# Patient Record
Sex: FEMALE | Race: BLACK OR AFRICAN AMERICAN | NOT HISPANIC OR LATINO | Employment: UNEMPLOYED | URBAN - METROPOLITAN AREA
[De-identification: names, ages, dates, MRNs, and addresses within clinical notes are randomized per-mention and may not be internally consistent; named-entity substitution may affect disease eponyms.]

---

## 2018-01-26 ENCOUNTER — OFFICE VISIT - HEALTHEAST (OUTPATIENT)
Dept: FAMILY MEDICINE | Facility: CLINIC | Age: 28
End: 2018-01-26

## 2018-01-26 ENCOUNTER — COMMUNICATION - HEALTHEAST (OUTPATIENT)
Dept: TELEHEALTH | Facility: CLINIC | Age: 28
End: 2018-01-26

## 2018-01-26 DIAGNOSIS — I44.1 MOBITZ TYPE 1 SECOND DEGREE AV BLOCK: ICD-10-CM

## 2018-01-26 DIAGNOSIS — R06.02 SOB (SHORTNESS OF BREATH): ICD-10-CM

## 2018-01-26 LAB
ATRIAL RATE - MUSE: 75 BPM
DIASTOLIC BLOOD PRESSURE - MUSE: NORMAL MMHG
INTERPRETATION ECG - MUSE: NORMAL
P AXIS - MUSE: 13 DEGREES
PR INTERVAL - MUSE: NORMAL MS
QRS DURATION - MUSE: 92 MS
QT - MUSE: 394 MS
QTC - MUSE: 383 MS
R AXIS - MUSE: 59 DEGREES
SYSTOLIC BLOOD PRESSURE - MUSE: NORMAL MMHG
T AXIS - MUSE: 51 DEGREES
VENTRICULAR RATE- MUSE: 57 BPM

## 2018-01-26 RX ORDER — DIPHENHYDRAMINE HCL 25 MG
25 CAPSULE ORAL EVERY 6 HOURS PRN
Status: SHIPPED | COMMUNITY
Start: 2018-01-26

## 2018-01-26 RX ORDER — NAPROXEN SODIUM 220 MG
220 TABLET ORAL 2 TIMES DAILY WITH MEALS
Status: SHIPPED | COMMUNITY
Start: 2018-01-26

## 2018-01-30 ENCOUNTER — OFFICE VISIT - HEALTHEAST (OUTPATIENT)
Dept: CARDIOLOGY | Facility: CLINIC | Age: 28
End: 2018-01-30

## 2018-01-30 DIAGNOSIS — I44.1 WENCKEBACH BLOCK: ICD-10-CM

## 2018-01-30 DIAGNOSIS — R07.89 OTHER CHEST PAIN: ICD-10-CM

## 2018-01-30 RX ORDER — CHLORAL HYDRATE 500 MG
2 CAPSULE ORAL DAILY
Status: SHIPPED | COMMUNITY
Start: 2018-01-30

## 2018-01-30 ASSESSMENT — MIFFLIN-ST. JEOR: SCORE: 1400.79

## 2018-01-31 ENCOUNTER — COMMUNICATION - HEALTHEAST (OUTPATIENT)
Dept: CARDIOLOGY | Facility: CLINIC | Age: 28
End: 2018-01-31

## 2018-01-31 LAB — B BURGDOR IGG+IGM SER QL: 0.04 INDEX VALUE

## 2018-02-09 ENCOUNTER — HOSPITAL ENCOUNTER (OUTPATIENT)
Dept: CARDIOLOGY | Facility: HOSPITAL | Age: 28
Discharge: HOME | End: 2018-02-09
Attending: INTERNAL MEDICINE

## 2018-02-09 DIAGNOSIS — I44.1 WENCKEBACH BLOCK: ICD-10-CM

## 2018-02-09 LAB
AORTIC ROOT: 3 CM
AORTIC VALVE MEAN VELOCITY: 94.8 CM/S
ASCENDING AORTA: 3 CM
AV DIMENSIONLESS INDEX VTI: 0.7
AV MEAN GRADIENT: 4 MMHG
AV PEAK GRADIENT: 7.2 MMHG
AV VALVE AREA: 2.3 CM2
AV VELOCITY RATIO: 0.7
BSA FOR ECHO PROCEDURE: 1.78 M2
CV BLOOD PRESSURE: NORMAL MMHG
CV ECHO HEIGHT: 65 IN
CV ECHO WEIGHT: 153 LBS
DOP CALC AO PEAK VEL: 134 CM/S
DOP CALC AO VTI: 28.6 CM
DOP CALC LVOT AREA: 3.14 CM2
DOP CALC LVOT DIAMETER: 2 CM
DOP CALC LVOT PEAK VEL: 99 CM/S
DOP CALC LVOT STROKE VOLUME: 65.3 CM3
DOP CALCLVOT PEAK VEL VTI: 20.8 CM
EJECTION FRACTION: 66 % (ref 55–75)
FRACTIONAL SHORTENING: 28.8 % (ref 28–44)
INTERVENTRICULAR SEPTUM IN END DIASTOLE: 1.21 CM (ref 0.6–0.9)
IVS/PW RATIO: 1.2
LA AREA 1: 13.2 CM2
LA AREA 2: 19.2 CM2
LEFT ATRIUM LENGTH: 4.7 CM
LEFT ATRIUM SIZE: 2.7 CM
LEFT ATRIUM VOLUME INDEX: 25.7 ML/M2
LEFT ATRIUM VOLUME: 45.8 ML
LEFT VENTRICLE DIASTOLIC VOLUME INDEX: 49.4 CM3/M2 (ref 34–74)
LEFT VENTRICLE DIASTOLIC VOLUME: 88 CM3 (ref 46–106)
LEFT VENTRICLE MASS INDEX: 91.7 G/M2
LEFT VENTRICLE SYSTOLIC VOLUME INDEX: 16.8 CM3/M2 (ref 11–31)
LEFT VENTRICLE SYSTOLIC VOLUME: 29.9 CM3 (ref 14–42)
LEFT VENTRICULAR INTERNAL DIMENSION IN DIASTOLE: 4.27 CM (ref 3.8–5.2)
LEFT VENTRICULAR INTERNAL DIMENSION IN SYSTOLE: 3.04 CM (ref 2.2–3.5)
LEFT VENTRICULAR MASS: 163.3 G
LEFT VENTRICULAR OUTFLOW TRACT MEAN GRADIENT: 2 MMHG
LEFT VENTRICULAR OUTFLOW TRACT MEAN VELOCITY: 58.8 CM/S
LEFT VENTRICULAR OUTFLOW TRACT PEAK GRADIENT: 4 MMHG
LEFT VENTRICULAR POSTERIOR WALL IN END DIASTOLE: 1.01 CM (ref 0.6–0.9)
LV STROKE VOLUME INDEX: 36.7 ML/M2
MITRAL VALVE E/A RATIO: 1.2
MV AVERAGE E/E' RATIO: 5.1 CM/S
MV DECELERATION TIME: 197 MS
MV E'TISSUE VEL-LAT: 18.9 CM/S
MV E'TISSUE VEL-MED: 11.4 CM/S
MV LATERAL E/E' RATIO: 4.1
MV MEDIAL E/E' RATIO: 6.8
MV PEAK A VELOCITY: 62.1 CM/S
MV PEAK E VELOCITY: 77.1 CM/S
NUC REST DIASTOLIC VOLUME INDEX: 2451.2 LBS
NUC REST SYSTOLIC VOLUME INDEX: 65 IN
TRICUSPID REGURGITATION PEAK PRESSURE GRADIENT: 11.2 MMHG
TRICUSPID VALVE PEAK REGURGITANT VELOCITY: 167 CM/S

## 2018-02-09 ASSESSMENT — MIFFLIN-ST. JEOR
SCORE: 1400.79
SCORE: 1400.79

## 2018-02-22 ENCOUNTER — COMMUNICATION - HEALTHEAST (OUTPATIENT)
Dept: SCHEDULING | Facility: CLINIC | Age: 28
End: 2018-02-22

## 2018-03-08 ENCOUNTER — COMMUNICATION - HEALTHEAST (OUTPATIENT)
Dept: ADMINISTRATIVE | Facility: CLINIC | Age: 28
End: 2018-03-08

## 2018-03-09 ENCOUNTER — OFFICE VISIT - HEALTHEAST (OUTPATIENT)
Dept: CARDIOLOGY | Facility: CLINIC | Age: 28
End: 2018-03-09

## 2018-03-09 DIAGNOSIS — I44.1 WENCKEBACH SECOND DEGREE AV BLOCK: ICD-10-CM

## 2018-03-09 DIAGNOSIS — I49.9 CARDIAC DYSRHYTHMIA: ICD-10-CM

## 2018-03-09 LAB
ATRIAL RATE - MUSE: 70 BPM
DIASTOLIC BLOOD PRESSURE - MUSE: NORMAL MMHG
INTERPRETATION ECG - MUSE: NORMAL
P AXIS - MUSE: 21 DEGREES
PR INTERVAL - MUSE: NORMAL MS
QRS DURATION - MUSE: 90 MS
QT - MUSE: 402 MS
QTC - MUSE: 391 MS
R AXIS - MUSE: 54 DEGREES
SYSTOLIC BLOOD PRESSURE - MUSE: NORMAL MMHG
T AXIS - MUSE: 51 DEGREES
VENTRICULAR RATE- MUSE: 57 BPM

## 2018-03-09 RX ORDER — FERROUS SULFATE 325(65) MG
1 TABLET ORAL
Status: SHIPPED | COMMUNITY
Start: 2018-03-09

## 2018-03-09 ASSESSMENT — MIFFLIN-ST. JEOR: SCORE: 1418.03

## 2018-03-13 ENCOUNTER — COMMUNICATION - HEALTHEAST (OUTPATIENT)
Dept: CARDIOLOGY | Facility: CLINIC | Age: 28
End: 2018-03-13

## 2018-03-15 ENCOUNTER — OFFICE VISIT - HEALTHEAST (OUTPATIENT)
Dept: FAMILY MEDICINE | Facility: CLINIC | Age: 28
End: 2018-03-15

## 2018-03-15 DIAGNOSIS — R05.9 COUGH: ICD-10-CM

## 2018-05-18 ENCOUNTER — COMMUNICATION - HEALTHEAST (OUTPATIENT)
Dept: CARDIOLOGY | Facility: CLINIC | Age: 28
End: 2018-05-18

## 2018-06-22 ENCOUNTER — OFFICE VISIT - HEALTHEAST (OUTPATIENT)
Dept: CARDIOLOGY | Facility: CLINIC | Age: 28
End: 2018-06-22

## 2018-06-22 DIAGNOSIS — I44.1 MOBITZ TYPE I WENCKEBACH ATRIOVENTRICULAR BLOCK: ICD-10-CM

## 2018-06-22 RX ORDER — IBUPROFEN 800 MG/1
800 TABLET, FILM COATED ORAL PRN
Status: SHIPPED | COMMUNITY
Start: 2018-05-19

## 2018-06-22 ASSESSMENT — MIFFLIN-ST. JEOR: SCORE: 1390.81

## 2018-12-12 ENCOUNTER — COMMUNICATION - HEALTHEAST (OUTPATIENT)
Dept: CARDIOLOGY | Facility: CLINIC | Age: 28
End: 2018-12-12

## 2018-12-12 ENCOUNTER — COMMUNICATION - HEALTHEAST (OUTPATIENT)
Dept: SCHEDULING | Facility: CLINIC | Age: 28
End: 2018-12-12

## 2021-05-31 VITALS — WEIGHT: 156.2 LBS

## 2021-06-01 VITALS — BODY MASS INDEX: 25.52 KG/M2 | HEIGHT: 65 IN | WEIGHT: 153.2 LBS

## 2021-06-01 VITALS — WEIGHT: 151 LBS | BODY MASS INDEX: 25.16 KG/M2 | HEIGHT: 65 IN

## 2021-06-01 VITALS — BODY MASS INDEX: 25.79 KG/M2 | WEIGHT: 155 LBS

## 2021-06-01 VITALS — BODY MASS INDEX: 26.16 KG/M2 | WEIGHT: 157 LBS | HEIGHT: 65 IN

## 2021-06-01 VITALS — WEIGHT: 153.2 LBS | HEIGHT: 65 IN | BODY MASS INDEX: 25.52 KG/M2

## 2021-06-15 NOTE — PROGRESS NOTES
Chief Complaint   Patient presents with     Shortness of Breath     Had pnumonoia in 2016 and she is having simliar sx. also sharp pain in the mid/upper back. Pt stated she is not coughing, also no fever. Fatigue.        History of Present Illness: Nursing notes reviewed. Patient had an episode of sharp upper chest pain about 5-6 cade ago lasting seconds. Since then, she has had intermittent upper chest discomfort daily since then, lasting up to a couple of minutes, with associated shortness of breath. She had chest pain and shortness of breath with prior pneumonia, so having pneumonia is her primary concern.  She has no prior history of heart problems.  She is concerned that not getting enough sleep every night for a couple of months may be contributing to her symptoms.  She has only been averaging about 2-3 hours per night.    Review of systems: See history of present illness, otherwise negative.     Current Outpatient Prescriptions   Medication Sig Dispense Refill     diphenhydrAMINE (BENADRYL) 25 mg capsule Take 25 mg by mouth every 6 (six) hours as needed for itching.       naproxen sodium (ALEVE) 220 MG tablet Take 220 mg by mouth 2 (two) times a day with meals.       No current facility-administered medications for this visit.        No past medical history on file.   No past surgical history on file.   Social History     Social History     Marital status: Single     Spouse name: N/A     Number of children: N/A     Years of education: N/A     Social History Main Topics     Smoking status: Never Smoker     Smokeless tobacco: Never Used     Alcohol use None     Drug use: None     Sexual activity: Not Asked     Other Topics Concern     None     Social History Narrative     None       History   Smoking Status     Never Smoker   Smokeless Tobacco     Never Used      Exam:   Blood pressure 102/62, pulse 65, temperature 97.8  F (36.6  C), temperature source Oral, resp. rate 16, weight 156 lb 3.2 oz (70.9 kg), last  menstrual period 01/10/2018, SpO2 98 %.    EXAM:   General: Vital signs reviewed. Patient is in no acute appearing distress with a normal pleasant affect.  She can get up and down from exam room chair and table without difficulty.  Breathing is non labored appearing. Patient is alert and oriented x 3.   ENT: Tympanic membranes are clear and without injection bilaterally, nasal turbinates show no injection or rhinorrhea, no pharyngeal injection or exudate.  Neck: supple with no adenopathy or palpable thyroid abnormality  Heart: mildly bradycardic rate and irregular rhythm without murmur, with a pause noted about every 3 beats.  Lungs: Clear to auscultation with good air flow bilaterally.  Skin: warm and dry      Recent Results (from the past 24 hour(s))   Electrocardiogram Perform and Read   Result Value Ref Range    SYSTOLIC BLOOD PRESSURE  mmHg    DIASTOLIC BLOOD PRESSURE  mmHg    VENTRICULAR RATE 57 BPM    ATRIAL RATE 75 BPM    P-R INTERVAL  ms    QRS DURATION 92 ms    Q-T INTERVAL 394 ms    QTC CALCULATION (BEZET) 383 ms    P Axis 13 degrees    R AXIS 59 degrees    T AXIS 51 degrees    MUSE DIAGNOSIS       Sinus rhythm with 2nd degree A-V block (Mobitz I)  Nonspecific ST abnormality  Abnormal ECG  No previous ECGs available  Confirmed by ALEX BLUE MD LOC:[facility] ([id number]) on 1/26/2018 4:02:08 PM      Results from exam reviewed with patient.    Assessment/Plan   1. SOB (shortness of breath)  Electrocardiogram Perform and Read       There are no Patient Instructions on file for this visit.   Rakesh Page DO

## 2021-06-16 PROBLEM — I44.1 WENCKEBACH SECOND DEGREE AV BLOCK: Status: ACTIVE | Noted: 2018-03-09

## 2021-06-16 NOTE — PROGRESS NOTES
Chief Complaint   Patient presents with     poss dry Cough     x2weeks  bad cold          HPI    Patient is here for 2 wks of nonproductive cough, worst at night, improved with honey. She also has mild nasal congestion. No fever, chills, chest pain, shortness of breath.    ROS: Pertinent ROS noted in HPI.     No Known Allergies    Patient Active Problem List   Diagnosis     Wenckebach second degree AV block       Family History   Problem Relation Age of Onset     No Medical Problems Mother      Hypertension Father        Social History     Social History     Marital status: Single     Spouse name: N/A     Number of children: N/A     Years of education: N/A     Occupational History     Not on file.     Social History Main Topics     Smoking status: Never Smoker     Smokeless tobacco: Never Used      Comment: Hookah     Alcohol use No     Drug use: No     Sexual activity: Not on file     Other Topics Concern     Not on file     Social History Narrative         Objective:    Vitals:    03/15/18 1615   BP: 120/75   Pulse: 62   Resp: 15   Temp: 98  F (36.7  C)   SpO2: 100%       Gen:NAD  Throat: oropharynx clear, tonsils normal  Ears: TMs clear without effusions  Nose: no discharge  Neck;NAD  CV: RRR, no M, R, G  Pulm: CTAB, normal effort    CXR - negative chest per my interpretation, discussed during visit.      Cough  -     XR Chest 2 Views      Likely virally mediated symptoms. No evidence of bacterial infection. Patient declined Benzonatate. Fluids, supportive measures as discussed.

## 2021-06-26 NOTE — PROGRESS NOTES
Progress Notes by Carrie Weldon MD at 1/30/2018 11:20 AM     Author: Carrie Weldon MD Service: -- Author Type: Physician    Filed: 2/1/2018  1:06 PM Encounter Date: 1/30/2018 Status: Signed    : Carrie Weldon MD (Physician)           Click to link to [facility] HEART CARE NOTE    Thank you, Dr. Hussein, for asking us to see Everett Colorado at the [facility].      Assessment/Recommendations   Assessment:    This is a 28-year-old woman with history of anemia who has had 2 weeks of feeling very congested and more short of breath.  Due to this she came into the ED and was found to be in Wenckebach.  No symptoms of presyncope or syncope.  No known tick bites.  Labs in the ED including thyroid studies were unremarkable.  Will plan on proceeding with Holter monitor as well as echocardiogram to evaluate further for structural heart disease as well.  Check Lyme titer as well.  If she develops symptoms such as lightheadedness or syncopal episodes will need to refer to EP for consideration of pacemaker placement.       History of Present Illness    Ms. Everett Colorado is a 28 y.o. female with no significant past medical history who I am seeing today rapid access clinic for second-degree heart block.  She reports that her medical history is only remarkable for pneumonia that she had back in 2016.  A couple weeks ago she started noticing some chest discomfort when she took a deep breath and some shortness of breath.  Due to this she came into urgent care last Friday.  On her twelve-lead EKG she was noted to have second-degree Mobitz 1 heart block and was sent to the ER.  After reviewed with cardiology she was discharged from the ED.  She is under a great deal of personal stress.  She lost 2 close family members the first week in December and since then has had difficulty sleeping and she works full-time as well.  Recently with the breathing trouble  she has felt congested.  No complaints of presyncope or syncopal episodes.  She has history of anemia with hemoglobin of 9 noted in the ED and was on iron pills for this however stopped taking them due to constipation.       Physical Examination Review of Systems   Vitals:    01/30/18 1133   BP: 102/62   Pulse: 88   Resp: 24     Body mass index is 25.49 kg/(m^2).  Wt Readings from Last 3 Encounters:   01/30/18 153 lb 3.2 oz (69.5 kg)   01/26/18 156 lb (70.8 kg)   01/26/18 156 lb 3.2 oz (70.9 kg)       General Appearance:   alert, no apparent distress   HEENT:  no scleral icterus; the mucous membranes are pink and moist                                  Neck: jugular venous pressure normal   Chest: the spine is straight and the chest is symmetric   Lungs:   respirations unlabored; the lungs are clear to auscultation   Cardiovascular:   regular rhythm with normal first and second heart sounds and no murmurs or gallops; carotid pulses are intact and there are no carotid bruits.   Abdomen:  no organomegaly, masses, bruits, or tenderness; bowel sounds are present   Extremities: no edema   Skin: no xanthelasma    General: WNL  Eyes: WNL  Ears/Nose/Throat: WNL  Lungs: Shortness of Breath  Heart: Chest Pain  Stomach: Constipation, Heartburn  Bladder: WNL  Muscle/Joints: WNL  Skin: WNL  Nervous System: WNL  Mental Health: Anxiety     Blood: WNL     Medical History  Surgical History Family History Social History   anemia No past surgical history on file.   No significant family history of congenital heart disease or premature coronary artery disease Social History     Social History   ? Marital status: Single     Spouse name: N/A   ? Number of children: N/A   ? Years of education: N/A     Occupational History   ? Not on file.     Social History Main Topics   ? Smoking status: Current Some Day Smoker   ? Smokeless tobacco: Never Used      Comment: Hookah   ? Alcohol use Not on file   ? Drug use: Not on file   ? Sexual activity:  Not on file     Other Topics Concern   ? Not on file     Social History Narrative          Medications  Allergies   Current Outpatient Prescriptions   Medication Sig Dispense Refill   ? calcium carbonate (TUMS EXTRA STRENGTH SMOOTHIES) 300 mg (750 mg) Chew Chew. PRN     ? diphenhydrAMINE (BENADRYL) 25 mg capsule Take 25 mg by mouth every 6 (six) hours as needed for itching.     ? naproxen sodium (ALEVE) 220 MG tablet Take 220 mg by mouth 2 (two) times a day with meals.     ? OMEGA-3/DHA/EPA/FISH OIL (FISH OIL-OMEGA-3 FATTY ACIDS) 300-1,000 mg capsule Take 2 g by mouth daily. gummies     ? PRENATAL VIT 91/IRON/FOLIC/DHA (PRENATAL + DHA ORAL) Take 1 tablet by mouth daily.       No current facility-administered medications for this visit.       No Known Allergies      Lab Results    Chemistry/lipid CBC Cardiac Enzymes/BNP/TSH/INR   Lab Results   Component Value Date    CREATININE 0.65 01/26/2018    BUN 5 (L) 01/26/2018    K 3.7 01/26/2018     01/26/2018     01/26/2018    CO2 24 01/26/2018    Lab Results   Component Value Date    WBC 6.0 01/26/2018    HGB 9.3 (L) 01/26/2018    HCT 32.8 (L) 01/26/2018    MCV 70 (L) 01/26/2018    PLT  01/26/2018      Comment:      Clumped Platelets; estimate from smear appears to be normal     Lab Results   Component Value Date    TROPONINI <0.01 01/26/2018    BNP 30 01/26/2018    TSH 2.69 01/26/2018

## 2021-06-26 NOTE — PROGRESS NOTES
Progress Notes by Carrie Weldon MD at 6/22/2018  2:30 PM     Author: Carrie Weldon MD Service: -- Author Type: Physician    Filed: 6/27/2018  9:34 AM Encounter Date: 6/22/2018 Status: Signed    : Carrie Weldon MD (Physician)           Click to link to [facility] HEART CARE NOTE    Thank you, Dr. Burns, for asking us to see Everett Colorado at the [facility].      Assessment/Recommendations   Assessment:    This is a 28 year old woman with Mobitz 1 second degree atrioventricular block.  She is asymptomatic and has been doing well since our last follow up.  She has been more active without presyncopal or syncopal episodes.  May follow up in one year.        History of Present Illness    Ms. Everett Colorado is a 28 y.o. female with history of Mobitz I atrioventricular block who I am seeing today in follow up.  She was found to have  Mobitz 1 second degree atrioventricular block during the day and at night with 2:1 block with heart rates in the 30s at night.  No complaints of light headedness, presyncopal or syncopal episodes.  She was seen by EP and no further recommendations made.  She has been doing very well. She has been exercising more frequently and eating healthier.     Holter monitor 2/9/18   HOLTER MONITOR      INTERPRETATION DATE:  02/13/2018      TEST DATE:  02/09/2018      INTERPRETATION:  Predominant rhythm is sinus rhythm which is present throughout the  monitoring period.  Mobitz type 1 second-degree AV block was common both during  daytime hours and at night.  Transient 2:1 AV block with heart rate in the 30s is  seen at night.  Average nocturnal heart rate was approximately 50 beats per minute.   During daytime hours 4:3 AV conduction and somewhat less certain degrees of AV block  was seen with average resting heart rate around 60 beats per minute; 1:1 AV  conduction with first-degree AV block was also relatively common.   Occasional atrial  premature beats were noted.  Rare single ventricular premature beats were noted, 5  over 24 hours.  No symptoms were noted in the diary.     CONCLUSION:  Sinus rhythm with Mobitz type 1 second-degree AV block commonly present with transient 2:1 AV block at night with heart rates in the 30s.  Average nocturnal  heart rate was 50 and average daytime resting heart rate was 60 associated with  Mobitz 1 AV block.        BERNARD COOL 02/13/2018 13:52:28  T 02/13/2018 14:23:26  R 02/13/2018 14:23:26  [id number]     Echocardiogram 2/9/18    No previous study for comparison.    Normal left ventricular size.    Left ventricle ejection fraction is normal. The calculated left ventricular ejection fraction is 66%.    Right ventricle not optimally visualized. Right ventricular function suggested to be within normal limits.    No definite valve abnormality observed.       Physical Examination Review of Systems   Vitals:    06/22/18 1454   BP: 90/68     Body mass index is 25.13 kg/(m^2).  Wt Readings from Last 3 Encounters:   06/22/18 151 lb (68.5 kg)   03/15/18 155 lb (70.3 kg)   03/09/18 157 lb (71.2 kg)       General Appearance:   alert, no apparent distress   HEENT:  no scleral icterus; the mucous membranes are pink and moist                                  Neck: jugular venous pressure normal   Chest: the spine is straight and the chest is symmetric   Lungs:   respirations unlabored; the lungs are clear to auscultation   Cardiovascular:   regular rhythm with normal first and second heart sounds and no murmurs or gallops   Abdomen:  no organomegaly, masses, bruits, or tenderness; bowel sounds are present   Extremities: no cyanosis, clubbing, or edema   Skin: no xanthelasma    General: WNL  Eyes: WNL  Ears/Nose/Throat: WNL  Lungs: Shortness of Breath  Heart: WNL  Stomach: WNL  Bladder: WNL  Muscle/Joints: WNL  Skin: WNL  Nervous System: WNL  Mental Health: Anxiety     Blood: WNL     Medical History   Surgical History Family History Social History   Past Medical History:   Diagnosis Date   ? Abnormal ECG     No past surgical history on file. Family History   Problem Relation Age of Onset   ? No Medical Problems Mother    ? Hypertension Father     Social History     Social History   ? Marital status: Single     Spouse name: N/A   ? Number of children: N/A   ? Years of education: N/A     Occupational History   ? Not on file.     Social History Main Topics   ? Smoking status: Never Smoker   ? Smokeless tobacco: Never Used      Comment: Hookah   ? Alcohol use No   ? Drug use: No   ? Sexual activity: Not on file     Other Topics Concern   ? Not on file     Social History Narrative          Medications  Allergies   Current Outpatient Prescriptions   Medication Sig Dispense Refill   ? ferrous sulfate 325 (65 FE) MG tablet Take 1 tablet by mouth daily with breakfast.     ? ibuprofen (ADVIL,MOTRIN) 800 MG tablet Take 800 mg by mouth as needed.     ? naproxen sodium (ALEVE) 220 MG tablet Take 220 mg by mouth 2 (two) times a day with meals.     ? calcium carbonate (TUMS EXTRA STRENGTH SMOOTHIES) 300 mg (750 mg) Chew Chew. PRN     ? diphenhydrAMINE (BENADRYL) 25 mg capsule Take 25 mg by mouth every 6 (six) hours as needed for itching.     ? fluticasone (FLOVENT DISKUS) 50 mcg/actuation diskus inhaler Inhale 1 puff as needed.     ? OMEGA-3/DHA/EPA/FISH OIL (FISH OIL-OMEGA-3 FATTY ACIDS) 300-1,000 mg capsule Take 2 g by mouth daily. gummies     ? PRENATAL VIT 91/IRON/FOLIC/DHA (PRENATAL + DHA ORAL) Take 1 tablet by mouth daily.     ? ranitidine (ZANTAC) 150 MG capsule Take 150 mg by mouth 2 (two) times a day.       No current facility-administered medications for this visit.       No Known Allergies      Lab Results    Chemistry/lipid CBC Cardiac Enzymes/BNP/TSH/INR   Lab Results   Component Value Date    CREATININE 0.65 01/26/2018    BUN 5 (L) 01/26/2018    K 3.7 01/26/2018     01/26/2018     01/26/2018    CO2 24  01/26/2018    Lab Results   Component Value Date    WBC 6.0 01/26/2018    HGB 9.3 (L) 01/26/2018    HCT 32.8 (L) 01/26/2018    MCV 70 (L) 01/26/2018    PLT  01/26/2018      Comment:      Clumped Platelets; estimate from smear appears to be normal     Lab Results   Component Value Date    TROPONINI <0.01 01/26/2018    BNP 30 01/26/2018    TSH 2.69 01/26/2018

## 2021-06-26 NOTE — PROGRESS NOTES
Progress Notes by Naomi Whalen MD at 3/9/2018  9:10 AM     Author: Naomi Whalen MD Service: -- Author Type: Physician    Filed: 3/9/2018 10:10 AM Encounter Date: 3/9/2018 Status: Signed    : Naomi Whalen MD (Physician)                 Arrhythmia Clinic    Thank you, Dr. Ayah Burns MD, for asking the [facility] Arrhythmia team to evaluate Everett Colorado in consultation for AV wenckebach      Assessment:     28 year old healthy female presents for evaluation of AV wenckebach     Plan:     AV wenckebach - both by ECG and holter without evidence for high grade AV block and no history of syncope or presyncope.  I reviewed the pathophysiology of AV wenckebach with Everett in detail and specifically discussed the benign findings of AV wenckebach.  I reviewed indications for therapy would only be due to symptomatic bradycardia.  Everett states that prior to being told not to exercise by the referring cardiologist, she was active working out multiple days weekly without limitations.    No restrictions at this time.    No medication changes.    Ok to resume previous physical activity.    Follow up with myself in 6 months or sooner if indicated.  She is aware that any episodes of syncope should be communicated to my office, at which time we could certainly consider longer duration monitoring or EP study, though again I am quite confident this is a benign finding.     Current History:   Everett [place] is a 28 y.o. female with no significant past medical history.  She was suffering from a URI and had shortness of breath and presented to urgent care.  From there, she was referred to ED where ECG demonstrated AV wenckebach.  She was discharged with follow up in cardiology clinic where holter monitor demonstrated frequent AV wenckebach but no evidence of high grade av block.  Echocardiogram was essentially normal without structural abnormalities.  Give these findings, she  was sent to EP clinic for further evaluation.  On direct questioning, Everett denies chest pains, worsening sob, miller, le edema, orthopnea, pnd, presyncope or syncope.    Past Medical History:     Past Medical History:   Diagnosis Date   ? Abnormal ECG        Past Surgical History:   History reviewed. No pertinent surgical history.    Family History:     Family History   Problem Relation Age of Onset   ? No Medical Problems Mother    ? Hypertension Father        Social History:    reports that she has never smoked. She has never used smokeless tobacco. She reports that she does not drink alcohol or use illicit drugs.    Meds:     Current Outpatient Prescriptions:   ?  calcium carbonate (TUMS EXTRA STRENGTH SMOOTHIES) 300 mg (750 mg) Chew, Chew. PRN, Disp: , Rfl:   ?  diphenhydrAMINE (BENADRYL) 25 mg capsule, Take 25 mg by mouth every 6 (six) hours as needed for itching., Disp: , Rfl:   ?  ferrous sulfate 325 (65 FE) MG tablet, Take 1 tablet by mouth daily with breakfast., Disp: , Rfl:   ?  fluticasone (FLOVENT DISKUS) 50 mcg/actuation diskus inhaler, Inhale 1 puff as needed., Disp: , Rfl:   ?  naproxen sodium (ALEVE) 220 MG tablet, Take 220 mg by mouth 2 (two) times a day with meals., Disp: , Rfl:   ?  OMEGA-3/DHA/EPA/FISH OIL (FISH OIL-OMEGA-3 FATTY ACIDS) 300-1,000 mg capsule, Take 2 g by mouth daily. gummies, Disp: , Rfl:   ?  ranitidine (ZANTAC) 150 MG capsule, Take 150 mg by mouth 2 (two) times a day., Disp: , Rfl:   ?  PRENATAL VIT 91/IRON/FOLIC/DHA (PRENATAL + DHA ORAL), Take 1 tablet by mouth daily., Disp: , Rfl:     Allergies:   Review of patient's allergies indicates no known allergies.    Review of Systems:   A full 12 point review of systems without pertinent positives except as per HPI or below.        Objective:      Physical Exam  Weight: Weight: 157 lb (71.2 kg)  Body mass index is 26.13 kg/(m^2).  /64 (Patient Site: Left Arm, Patient Position: Sitting, Cuff Size: Adult Large)  Pulse (!) 56   "Resp 16  Ht 5' 5\" (1.651 m)  Wt 157 lb (71.2 kg)  BMI 26.13 kg/m2    General Appearance:   Nad, alert and oriented x 3   HEENT:  Mmm, perrl   Neck: No goiter noted   Chest: No deformities noted   Lungs:   Clear bilaterally   Cardiovascular:   RRR   Abdomen:  Soft and non-tender   Extremities: No clubbing or edema   Skin: No rashes                 Cardiographics  ECG performed today personally reviewed - normal sinus rhythm with AV wenckebach noted  Holter 2/18 personally reviewed as above  Echo 2018 personally reviewed as above  Note from Dr. Weldon 2018 personally reviewed as above    Imaging  None    Lab Review   Lab Results   Component Value Date     01/26/2018    K 3.7 01/26/2018     01/26/2018    CO2 24 01/26/2018    BUN 5 (L) 01/26/2018    CREATININE 0.65 01/26/2018    CALCIUM 10.2 01/26/2018     Lab Results   Component Value Date    WBC 6.0 01/26/2018    HGB 9.3 (L) 01/26/2018    HCT 32.8 (L) 01/26/2018    MCV 70 (L) 01/26/2018    PLT  01/26/2018      Comment:      Clumped Platelets; estimate from smear appears to be normal      No results found for: CHOL, TRIG, HDL, LDLDIRECT      Naomi Whalen MD  [facility] Cardiology, Electrophysiology         "

## 2021-08-15 ENCOUNTER — HEALTH MAINTENANCE LETTER (OUTPATIENT)
Age: 31
End: 2021-08-15

## 2021-10-11 ENCOUNTER — HEALTH MAINTENANCE LETTER (OUTPATIENT)
Age: 31
End: 2021-10-11

## 2022-09-25 ENCOUNTER — HEALTH MAINTENANCE LETTER (OUTPATIENT)
Age: 32
End: 2022-09-25

## 2023-02-28 ENCOUNTER — HOSPITAL ENCOUNTER (EMERGENCY)
Facility: CLINIC | Age: 33
Discharge: HOME | End: 2023-03-01
Attending: EMERGENCY MEDICINE | Admitting: EMERGENCY MEDICINE
Payer: COMMERCIAL

## 2023-02-28 DIAGNOSIS — J06.9 VIRAL URI WITH COUGH: ICD-10-CM

## 2023-02-28 PROCEDURE — C9803 HOPD COVID-19 SPEC COLLECT: HCPCS

## 2023-02-28 PROCEDURE — 99283 EMERGENCY DEPT VISIT LOW MDM: CPT

## 2023-02-28 RX ORDER — IPRATROPIUM BROMIDE AND ALBUTEROL SULFATE 2.5; .5 MG/3ML; MG/3ML
SOLUTION RESPIRATORY (INHALATION)
Status: COMPLETED
Start: 2023-02-28 | End: 2023-03-01

## 2023-03-01 VITALS
HEART RATE: 88 BPM | RESPIRATION RATE: 16 BRPM | SYSTOLIC BLOOD PRESSURE: 121 MMHG | OXYGEN SATURATION: 100 % | TEMPERATURE: 98.3 F | DIASTOLIC BLOOD PRESSURE: 65 MMHG

## 2023-03-01 LAB
FLUAV RNA SPEC QL NAA+PROBE: NEGATIVE
FLUBV RNA RESP QL NAA+PROBE: NEGATIVE
RSV RNA SPEC NAA+PROBE: NEGATIVE
SARS-COV-2 RNA RESP QL NAA+PROBE: NEGATIVE

## 2023-03-01 PROCEDURE — 87637 SARSCOV2&INF A&B&RSV AMP PRB: CPT | Performed by: EMERGENCY MEDICINE

## 2023-03-01 ASSESSMENT — ENCOUNTER SYMPTOMS
NAUSEA: 1
DIARRHEA: 0
VOMITING: 1
HEADACHES: 1
SHORTNESS OF BREATH: 1
COUGH: 1
FEVER: 1
RHINORRHEA: 1

## 2023-03-01 ASSESSMENT — ACTIVITIES OF DAILY LIVING (ADL): ADLS_ACUITY_SCORE: 35

## 2023-03-01 NOTE — DISCHARGE INSTRUCTIONS

## 2023-03-01 NOTE — ED PROVIDER NOTES
History     Chief Complaint:  Cough and Fever     The history is provided by the patient.      Everett Colorado is a 33 year old female who presents with cough and fever. The patient reports having a cold for 3 weeks that resolved and came back a couple days ago. States that she is experiencing nausea, dry cough, headache, congestion, runny nose, and vomiting. Notes that 2 days on this medication has not improved her symptoms. Denies chest pain and diarrhea.  Patient is here with her child who has URI symptoms and bronchiolitis.    Independent Historian:   None - Patient Only    Review of External Notes: None     ROS:  Review of Systems   Constitutional: Positive for fever.   HENT: Positive for congestion and rhinorrhea.    Respiratory: Positive for cough and shortness of breath.    Cardiovascular: Negative for chest pain.   Gastrointestinal: Positive for nausea and vomiting. Negative for diarrhea.   Neurological: Positive for headaches.   All other systems reviewed and are negative.    Allergies:  No Known Allergies     Medications:    Fluticasone  Ranitidine  Prednisone  Albuterol     Past Medical History:    Wenckebach second degree AV block    Past Surgical History:     section    Family History:    Hypertension     Social History:  Presents with son  Presents via private vehicle     Physical Exam     Patient Vitals for the past 24 hrs:   BP Temp Temp src Pulse Resp SpO2   23 -- 98.3  F (36.8  C) Oral -- -- 99 %   23 -- -- -- -- -- 94 %   23 -- -- -- -- -- 100 %   23 -- -- -- -- -- 99 %   23 -- -- -- -- -- 99 %   23 -- -- -- -- -- 98 %   23 -- -- -- -- -- 97 %   23 -- -- -- -- -- 93 %   23 -- -- -- -- -- 96 %   23 -- -- -- -- -- 97 %   23 -- -- -- -- -- 97 %   23 -- -- -- -- -- 97 %   23 -- -- -- -- -- 99 %   23 -- -- -- -- -- 97 %   23 -- --  -- -- -- 98 %   03/01/23 0031 -- -- -- -- -- 99 %   03/01/23 0030 -- -- -- -- -- 98 %   03/01/23 0029 -- -- -- -- -- 96 %   03/01/23 0028 -- -- -- -- -- 95 %   03/01/23 0027 -- -- -- -- -- 99 %   03/01/23 0025 -- -- -- -- -- 99 %   03/01/23 0024 -- -- -- -- -- 99 %   03/01/23 0023 -- -- -- -- -- 98 %   03/01/23 0022 -- -- -- -- -- 97 %   03/01/23 0020 -- -- -- -- -- 98 %   03/01/23 0019 -- -- -- -- -- 98 %   03/01/23 0018 -- -- -- -- -- 99 %   03/01/23 0017 -- -- -- -- -- 99 %   03/01/23 0016 -- -- -- -- -- 99 %   03/01/23 0015 -- -- -- -- -- 99 %   03/01/23 0013 -- -- -- -- -- 98 %   03/01/23 0012 -- -- -- -- -- 99 %   03/01/23 0011 112/66 -- -- 90 20 97 %   03/01/23 0010 -- -- -- -- -- 97 %   03/01/23 0009 -- -- -- -- -- 99 %   03/01/23 0007 -- -- -- -- -- 100 %   03/01/23 0006 -- -- -- -- -- 100 %   03/01/23 0005 -- -- -- -- -- 98 %   03/01/23 0004 -- -- -- -- -- 100 %      Physical Exam      HEENT:    Tympanic membranes are clear bilateral.      Oropharynx is moist.       No tonsillar erythema, exudate or asymmetric edema.     No deviation of the uvula.     No pooling of secretions, trismus or sublingual edema.  Eyes:    Conjunctiva normal, PERRL  Neck:    Supple, no meningismus.       No pain with manipulation of the hyoid.   CV:     Regular rate and rhythm     No murmurs, rubs or gallops.    PULM:    Clear to auscultation bilateral.       No respiratory distress.       No stridor, rales or wheezing.  ABD:    Soft, non-tender, non-distended.      No rebound or guarding.     No splenomegaly.  MSK:     No gross deformity to all four extremities.   LYMPH:   No cervical lymphadenopathy.  NEURO:   Alert.  Normal muscular tone, no atrophy.  Skin:    Warm, dry and intact.    PSYCH:    Mood is good and affect is appropriate.        Emergency Department Course   Laboratory:  Labs Ordered and Resulted from Time of ED Arrival to Time of ED Departure   INFLUENZA A/B, RSV, & SARS-COV2 PCR - Normal       Result Value     Influenza A PCR Negative      Influenza B PCR Negative      RSV PCR Negative      SARS CoV2 PCR Negative        Emergency Department Course & Assessments:     Interventions:  Medications   ipratropium - albuterol 0.5 mg/2.5 mg/3 mL (DUONEB) 0.5-2.5 (3) MG/3ML neb solution (  Not Given 3/1/23 0012)      Independent Interpretation (X-rays, CTs, rhythm strip):  None    Consultations/Discussion of Management or Tests:  None     Social Determinants of Health affecting care:   None    Assessments:  0005 I obtained history and examined the patient as noted above.  0136 I rechecked and updated the patient.     Disposition:  The patient was discharged to home.     Impression & Plan    Medical Decision Makin-year-old female presents to ED with URI symptoms.  She is here with her son who has similar symptoms but is experiencing bronchiolitis.  Viral testing for COVID, influenza and RSV negative.  No evidence of otitis media, streptococcal pharyngitis.  No focal pulmonary findings to suggest pneumonia.  Evaluation consistent with viral URI.  Supportive measures indicated and return to ED for any worsening symptoms.      Diagnosis:    ICD-10-CM    1. Viral URI with cough  J06.9          Scribe Disclosure:  Judy ANDREWS, am serving as a scribe at 12:28 AM on 3/1/2023 to document services personally performed by German Posey MD based on my observations and the provider's statements to me.     3/1/2023   German Posey MD Matthews, Jeremiah R, MD  [phone]

## 2023-03-04 ENCOUNTER — OFFICE VISIT (OUTPATIENT)
Dept: URGENT CARE | Facility: URGENT CARE | Age: 33
End: 2023-03-04
Payer: COMMERCIAL

## 2023-03-04 VITALS
BODY MASS INDEX: 31.35 KG/M2 | OXYGEN SATURATION: 100 % | SYSTOLIC BLOOD PRESSURE: 112 MMHG | DIASTOLIC BLOOD PRESSURE: 75 MMHG | HEART RATE: 85 BPM | TEMPERATURE: 98.5 F | WEIGHT: 188.4 LBS

## 2023-03-04 DIAGNOSIS — J22 LOWER RESPIRATORY TRACT INFECTION: ICD-10-CM

## 2023-03-04 DIAGNOSIS — M62.838 NECK MUSCLE SPASM: ICD-10-CM

## 2023-03-04 DIAGNOSIS — R05.2 SUBACUTE COUGH: ICD-10-CM

## 2023-03-04 DIAGNOSIS — J01.90 ACUTE SINUSITIS WITH SYMPTOMS > 10 DAYS: Primary | ICD-10-CM

## 2023-03-04 PROCEDURE — 99203 OFFICE O/P NEW LOW 30 MIN: CPT | Performed by: FAMILY MEDICINE

## 2023-03-04 RX ORDER — METHOCARBAMOL 500 MG/1
500 TABLET, FILM COATED ORAL 3 TIMES DAILY PRN
Qty: 30 TABLET | Refills: 0 | Status: SHIPPED | OUTPATIENT
Start: 2023-03-04

## 2023-03-04 RX ORDER — BENZONATATE 200 MG/1
200 CAPSULE ORAL 3 TIMES DAILY PRN
Qty: 30 CAPSULE | Refills: 0 | Status: SHIPPED | OUTPATIENT
Start: 2023-03-04

## 2023-03-04 NOTE — PROGRESS NOTES
SUBJECTIVE:   Everett Colorado is a 33 year old female presenting with a chief complaint of congestion and body aches.  Endorsed more SOB.  Onset of symptoms was 4 week(s) ago.  Course of illness is worsening.    Severity moderate  Current and Associated symptoms: cough, congestion, body aches  Treatment measures tried include Tylenol/Ibuprofen, vitamins Fluids and Rest.  Predisposing factors include None.    Initially started as typical URI, congested as persisted and not resolved.  Now having more discolored phlegm.  No fever.    No history of asthma.    Was given prednisone when seen by primary provider but this made her cough more.  Has inhaler.    Developed more pain in back of neck, will radiated upwards      No past medical history on file.  Current Outpatient Medications   Medication Sig Dispense Refill     OMEGA-3/DHA/EPA/FISH OIL (FISH OIL-OMEGA-3 FATTY ACIDS) 300-1,000 mg capsule [OMEGA-3/DHA/EPA/FISH OIL (FISH OIL-OMEGA-3 FATTY ACIDS) 300-1,000 MG CAPSULE] Take 2 g by mouth daily. gummies       calcium carbonate (TUMS EXTRA STRENGTH SMOOTHIES) 300 mg (750 mg) Chew [CALCIUM CARBONATE (TUMS EXTRA STRENGTH SMOOTHIES) 300 MG (750 MG) CHEW] Chew. PRN (Patient not taking: Reported on 3/4/2023)       diphenhydrAMINE (BENADRYL) 25 mg capsule [DIPHENHYDRAMINE (BENADRYL) 25 MG CAPSULE] Take 25 mg by mouth every 6 (six) hours as needed for itching. (Patient not taking: Reported on 3/4/2023)       ferrous sulfate 325 (65 FE) MG tablet [FERROUS SULFATE 325 (65 FE) MG TABLET] Take 1 tablet by mouth daily with breakfast. (Patient not taking: Reported on 3/4/2023)       fluticasone (FLOVENT DISKUS) 50 mcg/actuation diskus inhaler [FLUTICASONE (FLOVENT DISKUS) 50 MCG/ACTUATION DISKUS INHALER] Inhale 1 puff as needed. (Patient not taking: Reported on 3/4/2023)       ibuprofen (ADVIL,MOTRIN) 800 MG tablet [IBUPROFEN (ADVIL,MOTRIN) 800 MG TABLET] Take 800 mg by mouth as needed. (Patient not taking: Reported on 3/4/2023)        naproxen sodium (ALEVE) 220 MG tablet [NAPROXEN SODIUM (ALEVE) 220 MG TABLET] Take 220 mg by mouth 2 (two) times a day with meals. (Patient not taking: Reported on 3/4/2023)       PRENATAL VIT 91/IRON/FOLIC/DHA (PRENATAL + DHA ORAL) [PRENATAL VIT 91/IRON/FOLIC/DHA (PRENATAL + DHA ORAL)] Take 1 tablet by mouth daily. (Patient not taking: Reported on 3/4/2023)       ranitidine (ZANTAC) 150 MG capsule [RANITIDINE (ZANTAC) 150 MG CAPSULE] Take 150 mg by mouth 2 (two) times a day. (Patient not taking: Reported on 3/4/2023)       Social History     Tobacco Use     Smoking status: Never     Smokeless tobacco: Never     Tobacco comments:     Hookah   Substance Use Topics     Alcohol use: No       ROS:  Review of systems negative except as stated above.    OBJECTIVE:  /75   Pulse 85   Temp 98.5  F (36.9  C)   Wt 85.5 kg (188 lb 6.4 oz)   SpO2 100%   BMI 31.35 kg/m    GENERAL APPEARANCE: healthy, alert and no distress  EYES: EOMI,  PERRL, conjunctiva clear  HENT: ear canals and TM's normal.  Nose and mouth without ulcers, erythema or lesions  RESP: lungs clear to auscultation - no rales, rhonchi or wheezes  CV: regular rates and rhythm, normal S1 S2, no murmur noted  PSYCH: mentation appears normal and affect normal/bright    ASSESSMENT/PLAN:  (J01.90) Acute sinusitis with symptoms > 10 days  (primary encounter diagnosis)  Comment: prolong  Plan: amoxicillin-clavulanate (AUGMENTIN) 875-125 MG         tablet            (J22) Lower respiratory tract infection  Comment: prolong  Plan: amoxicillin-clavulanate (AUGMENTIN) 875-125 MG         tablet, benzonatate (TESSALON) 200 MG capsule            (M62.838) Neck muscle spasm  Plan: methocarbamol (ROBAXIN) 500 MG tablet            (R05.2) Subacute cough  Plan: benzonatate (TESSALON) 200 MG capsule            Reassurance given, reviewed symptomatic treatment with tylenol, ibuprofen, plenty of fluids and rest.   RX Augmentin given for both sinus infection and lower  respiratory tract infection due to prolong symptoms.  RX tessalon perles given to help with cough.  Discussed neck muscle spasm and tension headache, RX robaxin given to ease up symptoms.    Follow up with primary provider if no improvement of symptoms in 1 week    Rod Aiken MD  March 4, 2023 4:50 PM

## 2023-03-11 ENCOUNTER — HOSPITAL ENCOUNTER (EMERGENCY)
Facility: CLINIC | Age: 33
Discharge: HOME | End: 2023-03-11
Payer: COMMERCIAL

## 2023-03-11 ENCOUNTER — APPOINTMENT (OUTPATIENT)
Dept: GENERAL RADIOLOGY | Facility: CLINIC | Age: 33
End: 2023-03-11
Payer: COMMERCIAL

## 2023-03-11 ENCOUNTER — APPOINTMENT (OUTPATIENT)
Dept: ULTRASOUND IMAGING | Facility: CLINIC | Age: 33
End: 2023-03-11
Payer: COMMERCIAL

## 2023-03-11 VITALS
HEIGHT: 65 IN | BODY MASS INDEX: 30.66 KG/M2 | OXYGEN SATURATION: 96 % | TEMPERATURE: 97.1 F | SYSTOLIC BLOOD PRESSURE: 94 MMHG | WEIGHT: 184 LBS | DIASTOLIC BLOOD PRESSURE: 59 MMHG | RESPIRATION RATE: 16 BRPM | HEART RATE: 66 BPM

## 2023-03-11 DIAGNOSIS — R10.13 ABDOMINAL PAIN, EPIGASTRIC: Primary | ICD-10-CM

## 2023-03-11 DIAGNOSIS — R07.9 CHEST PAIN, UNSPECIFIED TYPE: ICD-10-CM

## 2023-03-11 LAB
ALBUMIN SERPL BCG-MCNC: 4.2 G/DL (ref 3.5–5.2)
ALP SERPL-CCNC: 119 U/L (ref 35–104)
ALT SERPL W P-5'-P-CCNC: 21 U/L (ref 10–35)
ANION GAP SERPL CALCULATED.3IONS-SCNC: 12 MMOL/L (ref 7–15)
AST SERPL W P-5'-P-CCNC: 41 U/L (ref 10–35)
ATRIAL RATE - MUSE: 82 BPM
BASOPHILS # BLD AUTO: 0 10E3/UL (ref 0–0.2)
BASOPHILS NFR BLD AUTO: 0 %
BILIRUB SERPL-MCNC: 0.3 MG/DL
BUN SERPL-MCNC: 3.6 MG/DL (ref 6–20)
CALCIUM SERPL-MCNC: 9.4 MG/DL (ref 8.6–10)
CHLORIDE SERPL-SCNC: 100 MMOL/L (ref 98–107)
CREAT SERPL-MCNC: 0.53 MG/DL (ref 0.51–0.95)
DEPRECATED HCO3 PLAS-SCNC: 25 MMOL/L (ref 22–29)
DIASTOLIC BLOOD PRESSURE - MUSE: NORMAL MMHG
EOSINOPHIL # BLD AUTO: 0.1 10E3/UL (ref 0–0.7)
EOSINOPHIL NFR BLD AUTO: 1 %
ERYTHROCYTE [DISTWIDTH] IN BLOOD BY AUTOMATED COUNT: 13.1 % (ref 10–15)
GFR SERPL CREATININE-BSD FRML MDRD: >90 ML/MIN/1.73M2
GLUCOSE SERPL-MCNC: 97 MG/DL (ref 70–99)
HCG UR QL: NEGATIVE
HCT VFR BLD AUTO: 38.8 % (ref 35–47)
HGB BLD-MCNC: 13.4 G/DL (ref 11.7–15.7)
IMM GRANULOCYTES # BLD: 0 10E3/UL
IMM GRANULOCYTES NFR BLD: 0 %
INTERPRETATION ECG - MUSE: NORMAL
LIPASE SERPL-CCNC: 15 U/L (ref 13–60)
LYMPHOCYTES # BLD AUTO: 1.4 10E3/UL (ref 0.8–5.3)
LYMPHOCYTES NFR BLD AUTO: 17 %
MCH RBC QN AUTO: 31.9 PG (ref 26.5–33)
MCHC RBC AUTO-ENTMCNC: 34.5 G/DL (ref 31.5–36.5)
MCV RBC AUTO: 92 FL (ref 78–100)
MONOCYTES # BLD AUTO: 0.6 10E3/UL (ref 0–1.3)
MONOCYTES NFR BLD AUTO: 7 %
NEUTROPHILS # BLD AUTO: 6.3 10E3/UL (ref 1.6–8.3)
NEUTROPHILS NFR BLD AUTO: 75 %
NRBC # BLD AUTO: 0 10E3/UL
NRBC BLD AUTO-RTO: 0 /100
P AXIS - MUSE: 52 DEGREES
PLATELET # BLD AUTO: 287 10E3/UL (ref 150–450)
POTASSIUM SERPL-SCNC: 3.7 MMOL/L (ref 3.4–5.3)
PR INTERVAL - MUSE: 238 MS
PROT SERPL-MCNC: 7.4 G/DL (ref 6.4–8.3)
QRS DURATION - MUSE: 88 MS
QT - MUSE: 320 MS
QTC - MUSE: 373 MS
R AXIS - MUSE: 55 DEGREES
RBC # BLD AUTO: 4.2 10E6/UL (ref 3.8–5.2)
SODIUM SERPL-SCNC: 137 MMOL/L (ref 136–145)
SYSTOLIC BLOOD PRESSURE - MUSE: NORMAL MMHG
T AXIS - MUSE: -56 DEGREES
TROPONIN T SERPL HS-MCNC: <6 NG/L
VENTRICULAR RATE- MUSE: 82 BPM
WBC # BLD AUTO: 8.5 10E3/UL (ref 4–11)

## 2023-03-11 PROCEDURE — 36415 COLL VENOUS BLD VENIPUNCTURE: CPT | Performed by: EMERGENCY MEDICINE

## 2023-03-11 PROCEDURE — 99285 EMERGENCY DEPT VISIT HI MDM: CPT | Mod: 25

## 2023-03-11 PROCEDURE — 81025 URINE PREGNANCY TEST: CPT

## 2023-03-11 PROCEDURE — 76705 ECHO EXAM OF ABDOMEN: CPT

## 2023-03-11 PROCEDURE — 93005 ELECTROCARDIOGRAM TRACING: CPT

## 2023-03-11 PROCEDURE — 80053 COMPREHEN METABOLIC PANEL: CPT

## 2023-03-11 PROCEDURE — 71046 X-RAY EXAM CHEST 2 VIEWS: CPT

## 2023-03-11 PROCEDURE — 83690 ASSAY OF LIPASE: CPT

## 2023-03-11 PROCEDURE — 36415 COLL VENOUS BLD VENIPUNCTURE: CPT

## 2023-03-11 PROCEDURE — 85025 COMPLETE CBC W/AUTO DIFF WBC: CPT

## 2023-03-11 PROCEDURE — 84484 ASSAY OF TROPONIN QUANT: CPT

## 2023-03-11 ASSESSMENT — ACTIVITIES OF DAILY LIVING (ADL)
ADLS_ACUITY_SCORE: 35
ADLS_ACUITY_SCORE: 35

## 2023-03-11 NOTE — ED PROVIDER NOTES
History     Chief Complaint:  Abdominal Pain and Chest Pain       [facility]   Everett Colorado is an otherwise healthy 33 year old female who came into the emergency department by ambulance today for sharp upper abdominal pain radiating into her chest that came on this morning after breakfast.  The patient states she has been struggling with cold symptoms since 2/28.  She was seen in the emergency department and diagnosed with a URI at that time.  She then had an office visit 3/4/2023 and was found to have a sinus infection and given Augmentin, which she just stopped taking.  She states her cold symptoms have not gotten any worse and she woke up feeling fine this morning until this episode of sharp upper abdominal pain began.  She states she took a warm bath, which did not help.  The pain got worse and she began sweating and feeling nauseous so she called EMS.  EMS gave her a bolus of fluids and some Zofran.  She states she felt quite improved after those inventions.  She denies any palpitations.  No fevers.  Recently tested negative for COVID.  No vomiting.  No blood in her stool or diarrhea.  She states the upper abdominal pain radiated into her chest.  She felt like it was hard to take a deep breath in and felt short of breath and this pain came on.  Denies any palpitations at the time.  She has no personal or family history of a blood clot.  She does not take hormones.  No recent surgeries, immobilizations, long distance travel.  No active cancer treatments.  No recent leg swelling or calf pain.  Denies any urinary symptoms.    Independent Historian:   None - Patient Only    Review of External Notes: Reviewed note from 2/28/2023 concerning viral URI with cough as well as the note from 3/4/2023 in which she was found to have acute sinusitis and prescribed Augmentin and Tessalon for her cough.    ROS:  Review of Systems   A comprehensive ROS was obtained and is negative aside from those called out in the HPI  "above.    Allergies:  Beta Adrenergic Blockers  Calcium Channel Blockers     Medications:    Albuterol  Augmentin  Tessalon  Benadryl  Flovent diskus  Robaxin  Aleve  Zantac  Roxicodone  Anaprox  Zyrtec    Past Medical History:    Wenckebach second degree AV block  Anemia    Past Surgical History:     section, low transverse     Family History:    Father - hypertension    Social History:  Presents to the ED via EMS.   in the room with her.  PCP: Ayah Burns     Physical Exam     Patient Vitals for the past 24 hrs:   BP Temp Pulse Resp SpO2 Height Weight   23 1759 107/52 -- 76 -- 97 % -- --   23 1646 -- -- -- -- 99 % -- --   23 1631 92/66 -- -- -- -- -- --   23 1523 100/65 97.1  F (36.2  C) 82 16 100 % 1.651 m (5' 5\") 83.5 kg (184 lb)      Physical Exam  General: Pleasant nontoxic-appearing adult female.  HENT:   Head: Atraumatic.  Mouth/Throat: Oropharynx clear and moist.  Eyes: Conjunctive and EOM normal. PERRLA.  Neck: Normal ROM. No rigidity.  CV: Regular rate and rhythm. Normal S1, S2. No appreciable murmurs.  Resp: Lungs clear to auscultation bilaterally. Normal respiratory effort.  Intermittently coughing throughout exam.  GI: Bowel sounds normal. Abdomen soft.  And has tenderness in the epigastric area without rebound or guarding.  I did not appreciate tenderness in other areas.  Negative Garcia sign.  MSK: Normal range of motion.  Skin: Warm and dry.  No rash.  Neuro: Awake, alert, oriented x 3.  Psych: Normal mood and affect.    Emergency Department Course     ECG results from 23   EKG 12 lead     Value    Systolic Blood Pressure     Diastolic Blood Pressure     Ventricular Rate 82    Atrial Rate 82    OH Interval 238    QRS Duration 88        QTc 373    P Axis 52    R AXIS 55    T Axis -56    Interpretation ECG      Sinus rhythm with 1st degree A-V block  Minimal voltage criteria for LVH, may be normal variant ( Sokolow-Frazier )  Nonspecific T wave " abnormality  Abnormal ECG  When compared with ECG of 09-MAR-2018 09:21,  Sinus rhythm is no longer with 2nd degree A-V block (Mobitz I)  Nonspecific T wave abnormality now evident in Inferior leads  Nonspecific T wave abnormality now evident in Anterolateral leads  Confirmed by GENERATED REPORT, COMPUTER ([id number]),  Wes Martinez ([id number]) on 3/11/2023 3:43:09 PM       Imaging:  Abdomen US, limited (RUQ only)   Final Result   IMPRESSION:   1.  No sonographic evidence of cholelithiasis or cholecystitis.            Chest XR,  PA & LAT   Final Result   IMPRESSION: Negative chest.         Report per radiology    Laboratory:  Labs Ordered and Resulted from Time of ED Arrival to Time of ED Departure   COMPREHENSIVE METABOLIC PANEL - Abnormal       Result Value    Sodium 137      Potassium 3.7      Chloride 100      Carbon Dioxide (CO2) 25      Anion Gap 12      Urea Nitrogen 3.6 (*)     Creatinine 0.53      Calcium 9.4      Glucose 97      Alkaline Phosphatase 119 (*)     AST 41 (*)     ALT 21      Protein Total 7.4      Albumin 4.2      Bilirubin Total 0.3      GFR Estimate >90     HCG QUALITATIVE URINE - Normal    hCG Urine Qualitative Negative     TROPONIN T, HIGH SENSITIVITY - Normal    Troponin T, High Sensitivity <6     LIPASE - Normal    Lipase 15     CBC WITH PLATELETS AND DIFFERENTIAL    WBC Count 8.5      RBC Count 4.20      Hemoglobin 13.4      Hematocrit 38.8      MCV 92      MCH 31.9      MCHC 34.5      RDW 13.1      Platelet Count 287      % Neutrophils 75      % Lymphocytes 17      % Monocytes 7      % Eosinophils 1      % Basophils 0      % Immature Granulocytes 0      NRBCs per 100 WBC 0      Absolute Neutrophils 6.3      Absolute Lymphocytes 1.4      Absolute Monocytes 0.6      Absolute Eosinophils 0.1      Absolute Basophils 0.0      Absolute Immature Granulocytes 0.0      Absolute NRBCs 0.0       Emergency Department Course & Assessments:  Interventions:  Medications - No data to display      Assessments:  1551 I obtained history and examined the patient as noted above.   1714 I rechecked the patient and explained findings.     Independent Interpretation (X-rays, CTs, rhythm strip):  I reviewed today's chest X-ray and agree with the interpretation.    Consultations/Discussion of Management or Tests:  None     Social Determinants of Health affecting care:   None    Disposition:  The patient was discharged to home.     Impression & Plan      Medical Decision Making:  Everett is an otherwise healthy and pleasant 32yo female who came into the ED today by EMS concerned about a sharm upper abdominal pain she experienced this morning radiating into her chest, accompanied by nausea. This is in the setting of recent cough and cold symptoms and negative COVID test.  Was given a bolus of fluids and zofran by EMS. On arrival here, she was afebrile and nontoxic appearing. Vitals WNL.  Lab work was obtained.  Lipase not elevated, reassuring against pancreatitis.  The CBC showed no leukocytosis and the patient was afebrile, reassuring against infectious etiology.  Hemoglobin normal.  No electrolyte derangement and normal kidney function.  Patient had mildly elevated alk phos and ALT.  Pursued right upper quadrant ultrasound that showed no evidence for cholelithiasis or cholecystitis.  No marked tenderness on exam to pursue CT imaging today.  Will provide patient with GI follow-up and prescription for Omeprazole to trial. Return precautions discussed including development of a fever, bloody stools, persistent vomiting, worsening new or focal abdominal pain.    Patient stated that her upper abdominal pain radiated into her chest.  EKG showed normal sinus rhythm without any acute ischemic changes and no evidence for pericarditis.  And troponin ruled her out for ACS.  She is PERC negative, so no concern for PE.  Chest x-ray showed no evidence for pneumonia, pleural effusion, cardiomegaly, widened mediastinum.  Patient is  a low risk heart score as follows and appropriate for discharge home:    Heart Score:  History: Slightly suspicious  EKG: Normal sinus rhythm  Age: 33  Risk factors: None  Initial troponin: Within normal limits  Total: 0 points  Low risk     Ultimately, the patient felt improved with fluids and Zofran.  She was discharged home in the care of her  with close return precautions.  She will follow-up with her primary care physician for recheck.    Diagnosis:  No diagnosis found.     Discharge Medications:  New Prescriptions    No medications on file     Scribe Disclosure:  I, Hemant Easton, am serving as a scribe at 3:51 PM on 3/11/2023 to document services personally performed by Nellie Medley PA-C based on my observations and the provider's statements to me.   3/11/2023   Nellie Medley PA-C Dewing, Jennifer C, PA-C  03/11/23 1933

## 2023-03-11 NOTE — ED TRIAGE NOTES
Pt BIBA for reports of chest pain/epigastric pain that began this morning when she was cooking. EMS reports pt tried taking a bath without relief of pain and when they arrived she was slightly hypotensive. Pt reports being sick for the past 3 weeks and was on an antibiotic that she just recently stopped taking. Reports pain is worse with deep breaths. Received fluids and zofran with some improvement of her symptoms.      Triage Assessment     Row Name 03/11/23 1511       Triage Assessment (Adult)    Airway WDL WDL       Respiratory WDL    Respiratory WDL WDL       Skin Circulation/Temperature WDL    Skin Circulation/Temperature WDL WDL       Cardiac WDL    Cardiac WDL X;all       Chest Pain Assessment    Chest Pain Location epigastric       Peripheral/Neurovascular WDL    Peripheral Neurovascular WDL WDL       Cognitive/Neuro/Behavioral WDL    Cognitive/Neuro/Behavioral WDL WDL

## 2023-03-12 NOTE — ED NOTES
Discharged to home with .   All belongings sent with patient.   AVS and discharge instructions given and explained to patient, verbalized understanding.

## 2023-10-14 ENCOUNTER — HEALTH MAINTENANCE LETTER (OUTPATIENT)
Age: 33
End: 2023-10-14

## 2024-12-07 ENCOUNTER — HEALTH MAINTENANCE LETTER (OUTPATIENT)
Age: 34
End: 2024-12-07

## 2025-04-10 ENCOUNTER — VIRTUAL VISIT (OUTPATIENT)
Dept: FAMILY MEDICINE | Facility: CLINIC | Age: 35
End: 2025-04-10
Payer: COMMERCIAL

## 2025-04-10 DIAGNOSIS — E66.09 CLASS 1 OBESITY DUE TO EXCESS CALORIES WITHOUT SERIOUS COMORBIDITY WITH BODY MASS INDEX (BMI) OF 30.0 TO 30.9 IN ADULT: Primary | ICD-10-CM

## 2025-04-10 DIAGNOSIS — E66.811 CLASS 1 OBESITY DUE TO EXCESS CALORIES WITHOUT SERIOUS COMORBIDITY WITH BODY MASS INDEX (BMI) OF 30.0 TO 30.9 IN ADULT: Primary | ICD-10-CM

## 2025-04-10 RX ORDER — GUAIFENESIN 600 MG/1
TABLET, EXTENDED RELEASE ORAL 2 TIMES DAILY
COMMUNITY

## 2025-04-10 NOTE — PROGRESS NOTES
Everett is a 35 year old who is being evaluated via a billable video visit.    How would you like to obtain your AVS? MyChart  If the video visit is dropped, the invitation should be resent by: Text to cell phone: [phone]  Will anyone else be joining your video visit? No      Assessment & Plan     Class 1 obesity due to excess calories without serious comorbidity with body mass index (BMI) of 30.0 to 30.9 in adult  Patient is frustrated with difficulty with weight loss  Discussed recommendation for follow-up visit in person to obtain vitals evaluate the patient and blood work to discuss further options for weight loss medications  Patient is agreement to make an appointment at clinic                  Subjective   Everett is a 35 year old, presenting for the following health issues:  Weight Management (Currently drinking 2 liters of water a day and working out, cannot get to goal weight)      4/10/2025    10:32 AM   Additional Questions   Roomed by Johanny EDOUARD CMA   Accompanied by self     Video Start Time:  1045    HPI      Patient seen via video visit for weight loss discussion.  Patient is 5ft  5inches and about 185 pounds BMI just over 30 frustrated with the lack of weight loss progression.  She feels that she has been physically active and watching what she is eating try to eat healthy and has had difficulty losing weight after her last pregnancy a couple years ago.  She is inquiring about the possibility of Zepbound-discussed with her multiple options but we need to rule out other concerns for difficulty with weight loss I would recommend an in person visit to obtain vitals have a physical evaluation and blood work-discussed with the possibility of checking thyroid levels especially since weight loss was difficult after the second child.  Elsa history of diabetes as well and hyperlipidemia.            Objective    Vitals - Patient Reported  Weight (Patient Reported): 84.8 kg (187 lb)  Height (Patient Reported):  "165.1 cm (5' 5\")  BMI (Based on Pt Reported Ht/Wt): 31.12        Physical Exam   GENERAL: alert and no distress  EYES: Eyes grossly normal to inspection.  No discharge or erythema, or obvious scleral/conjunctival abnormalities.  RESP: No audible wheeze, cough, or visible cyanosis.    SKIN: Visible skin clear. No significant rash, abnormal pigmentation or lesions.  NEURO: Cranial nerves grossly intact.  Mentation and speech appropriate for age.  PSYCH: Appropriate affect, tone, and pace of words          Video-Visit Details    Type of service:  Video Visit   Video End Time: 1100  Originating Location (pt. Location): Home    Distant Location (provider location):  On-site  Platform used for Video Visit: [employer]  Signed Electronically by: Jameel Alejo MD    "